# Patient Record
Sex: MALE | Race: WHITE | NOT HISPANIC OR LATINO
[De-identification: names, ages, dates, MRNs, and addresses within clinical notes are randomized per-mention and may not be internally consistent; named-entity substitution may affect disease eponyms.]

---
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
## 2021-03-23 ENCOUNTER — RECORDS - HEALTHEAST (OUTPATIENT)
Dept: ADMINISTRATIVE | Facility: OTHER | Age: 21
End: 2021-03-23

## 2021-04-12 ENCOUNTER — RECORDS - HEALTHEAST (OUTPATIENT)
Dept: ADMINISTRATIVE | Facility: OTHER | Age: 21
End: 2021-04-12

## 2021-05-10 ENCOUNTER — RECORDS - HEALTHEAST (OUTPATIENT)
Dept: ADMINISTRATIVE | Facility: OTHER | Age: 21
End: 2021-05-10

## 2021-05-30 NOTE — TELEPHONE ENCOUNTER
Pt on 8/19/19 CSS schedule for Hep B #2 and MMR #2.    Please review/approve pended order.    Thank you.

## 2021-05-30 NOTE — TELEPHONE ENCOUNTER
Left message to call back for: No message able to be left  Information to relay to patient:  All labs at recent visit were normal.    Daphne Hernandez, Clinic Assistant\

## 2021-05-30 NOTE — TELEPHONE ENCOUNTER
Question following Office Visit  When did you see your provider: 07/17/19    What is your question: Patient will need follow up orders for MMR and Hep B 2nd dosage immunizations . Mother states he was to receive a follow up date to come back in to complete immunizations .  Please advise   Okay to leave a detailed message: Yes

## 2021-06-03 VITALS — WEIGHT: 186 LBS | HEIGHT: 78 IN | BODY MASS INDEX: 21.52 KG/M2

## 2021-06-04 VITALS
DIASTOLIC BLOOD PRESSURE: 78 MMHG | OXYGEN SATURATION: 97 % | TEMPERATURE: 98.2 F | HEART RATE: 64 BPM | RESPIRATION RATE: 18 BRPM | WEIGHT: 213 LBS | SYSTOLIC BLOOD PRESSURE: 112 MMHG | BODY MASS INDEX: 24.93 KG/M2

## 2021-06-07 ENCOUNTER — RECORDS - HEALTHEAST (OUTPATIENT)
Dept: ADMINISTRATIVE | Facility: OTHER | Age: 21
End: 2021-06-07

## 2021-06-09 NOTE — TELEPHONE ENCOUNTER
Medication Question or Clarification  Who is calling: Mother  What medication are you calling about (include dose and sig)?:   ciprofloxacin-dexamethasone (CIPRODEX) otic suspension  7.5 mL  0  7/23/2020 7/30/2020     Sig - Route: Administer 4 drops to the right ear 2 (two) times a day for 7 days. - Right Ear     Sent to pharmacy as: Ciprodex 0.3 %-0.1 % ear drops,suspension (ciprofloxacin-dexamethasone)         Who prescribed the medication?: Dr Pride  What is your question/concern?: States this medication is too expensive and would like an alternative sent to the pharmacy.  Please address today and call mother when sent.  Requested Pharmacy: [facility]  Okay to leave a detailed message?: Yes

## 2021-06-09 NOTE — PROGRESS NOTES
Walk In Care Note                                                        Date of Visit: 7/23/2020     Chief Complaint   Joshua Jones is a(n) 20 y.o. White or  male who presents to [facility] with the following complaint(s):  Ear Pain (Right ear pain)       Assessment and Plan   1. Infective otitis externa, right  - ciprofloxacin-dexamethasone (CIPRODEX) otic suspension; Administer 4 drops to the right ear 2 (two) times a day for 7 days.  Dispense: 7.5 mL; Refill: 0      Treating otitis externa with Ciprodex as listed above. Will change to ofloxacin drops if Ciprodex is too expensive. Continue acetaminophen and / or ibuprofen as needed for discomfort. Recommended no swimming for 1 week.     Counseled patient regarding assessment and plan for evaluation and treatment. Questions were answered. See AVS for the specific written instructions and educational handout(s) regarding otitis externa that were provided at the conclusion of the visit.     Discussed signs / symptoms that warrant urgent / emergent medical attention.     Follow up within 3 days if not improving.      History of Present Illness   Primary symptom: Ear pain  Onset: 5 days ago  Laterality: Right  Progression: Initially improved, now worsening over the past 2 days  Decreased hearing: Muffled  Ear discharge: No  Fevers: No  Chills: No  Upper respiratory symptoms: None  Home therapies utilized: OTC Swimmer's Ear Drops, acetaminophen, and ibuprofen.   History of otitis media: No  History of tympanostomy tubes: No  Recent swimming: Yes  History of cerumen impaction: No     Review of Systems   Review of Systems   All other systems reviewed and are negative.       Physical Exam   Vitals:    07/23/20 1136   BP: 112/78   Patient Site: Right Arm   Patient Position: Sitting   Cuff Size: Adult Regular   Pulse: 64   Resp: 18   Temp: 98.2  F (36.8  C)   TempSrc: Oral   SpO2: 97%   Weight: 213 lb (96.6 kg)     Physical Exam  Vitals signs and  nursing note reviewed.   Constitutional:       General: He is not in acute distress.     Appearance: He is well-developed and normal weight. He is not ill-appearing or toxic-appearing.   HENT:      Head: Normocephalic and atraumatic.      Right Ear: Tympanic membrane and external ear normal. Drainage (moist debris), swelling and tenderness present. No mastoid tenderness.      Left Ear: Tympanic membrane, ear canal and external ear normal.   Neck:      Musculoskeletal: Neck supple. No edema or erythema.   Lymphadenopathy:      Head:      Right side of head: No preauricular or posterior auricular adenopathy.      Cervical: No cervical adenopathy.   Skin:     General: Skin is warm and dry.      Coloration: Skin is not pale.      Findings: No rash.   Neurological:      General: No focal deficit present.      Mental Status: He is alert and oriented to person, place, and time.          Diagnostic Studies   Laboratory:  N/A  Radiology:  N/A  Electrocardiogram:  N/A     Procedure Note   N/A     Pertinent History   The following portions of the patient's history were reviewed and updated as appropriate: allergies, current medications, past family history, past medical history, past social history, past surgical history and problem list.    Patient has Vaccine refused by patient on their problem list.    Patient has a past medical history of Vaccination not carried out because of caregiver refusal (6/9/2016), Varicella, and Warts.    Patient has a past surgical history that includes arm surgery and arm surgery .    Patient's family history includes Cancer in his maternal grandmother; Heart disease in his maternal grandmother; Leukemia in his maternal grandfather; No Medical Problems in his father and mother.    Patient reports that he has never smoked. He has never used smokeless tobacco. He reports that he does not drink alcohol or use drugs.     Portions of this note have been dictated using voice recognition software. Any  grammatical or contextual distortions are unintentional and inherent to the software.    Jesús Pride MD  [facility]

## 2021-06-10 NOTE — TELEPHONE ENCOUNTER
Called regarding request for substitute medication for Ciprodex due to cost.  Sent ofloxacin.  Parent informed as she was the one that called.  No further questions.     Rosy Mitchell, CNP

## 2021-06-16 PROBLEM — Z28.20 VACCINE REFUSED BY PATIENT: Status: ACTIVE | Noted: 2019-07-17

## 2021-06-18 NOTE — PATIENT INSTRUCTIONS - HE
Patient Instructions by Jesús Pride MD at 7/23/2020 11:20 AM     Author: Jesús Pride MD Service: -- Author Type: Physician    Filed: 7/23/2020 12:00 PM Encounter Date: 7/23/2020 Status: Addendum    : Jesús Pride MD (Physician)    Related Notes: Original Note by Jesús Pride MD (Physician) filed at 7/23/2020 11:59 AM       -Use Ciprodex drops as directed.  -Take acetaminophen and / or ibuprofen as needed for discomfort.  -No swimming for the next week.  Patient Education     External Ear Infection (Adult)    External otitis (also called swimmers ear) is an infection in the ear canal. It is often caused by bacteria or fungus. It can occur a few days after water gets trapped in the ear canal (from swimming or bathing). It can also occur after cleaning too deeply in the ear canal with a cotton swab or other object. Sometimes, hair care products get into the ear canal and cause this problem.  Symptoms can include pain, fever, itching, redness, drainage, or swelling of the ear canal. Temporary hearing loss may also occur.  Home care    Do not try to clean the ear canal. This can push pus and bacteria deeper into the canal.    Use prescribed ear drops as directed. These help reduce swelling and fight the infection. If an ear wick was placed in the ear canal, apply drops right onto the end of the wick. The wick will draw the medicine into the ear canal even if it is swollen closed.    A cotton ball may be loosely placed in the outer ear to absorb any drainage.    You may use acetaminophen or ibuprofen to control pain, unless another medicine was prescribed. Note: If you have chronic liver or kidney disease or ever had a stomach ulcer or GI bleeding, talk to your healthcare provider before taking any of these medicines.    Do not allow water to get into your ear when bathing. Also, don't swim until the infection has cleared.  Prevention    Keep your ears dry. This helps lower the risk of  infection. Dry your ears with a towel or hair dryer after getting wet. Also, use ear plugs when swimming.    Do not stick any objects in the ear to remove wax.    If you feel water trapped in your ear, use ear drops right away. You can get these drops over the counter at most drugstores. They work by removing water from the ear canal.  Follow-up care  Follow up with your healthcare provider in 1 week, or as advised.  When to seek medical advice  Call your healthcare provider right away if any of these occur:    Ear pain becomes worse or doesnt improve after 3 days of treatment    Redness or swelling of the outer ear occurs or gets worse    Headache    Painful or stiff neck    Drowsiness or confusion    Fever of 100.4 F (38 C) or higher, or as directed by your healthcare provider    Seizure  Date Last Reviewed: 10/1/2017    4386-1160 [employer]. [address] [place] [id number]. All rights reserved. This information is not intended as a substitute for professional medical care. Always follow your healthcare professional's instructions.

## 2021-06-19 NOTE — LETTER
Letter by Bekah Mejia MD at      Author: Bekah Mejia MD Service: -- Author Type: --    Filed:  Encounter Date: 7/18/2019 Status: (Other)         Joshua Jones  [address] [id number]             July 18, 2019         Dear Mr. Jones,    Below are the results from your recent visit:    Resulted Orders   Comprehensive Metabolic Panel   Result Value Ref Range    Sodium 138 136 - 145 mmol/L    Potassium 4.1 3.5 - 5.0 mmol/L    Chloride 105 98 - 107 mmol/L    CO2 25 22 - 31 mmol/L    Anion Gap, Calculation 8 5 - 18 mmol/L    Glucose 89 70 - 125 mg/dL    BUN 13 8 - 22 mg/dL    Creatinine 0.86 0.70 - 1.30 mg/dL    GFR MDRD Af Amer >60 >60 mL/min/1.73m2    GFR MDRD Non Af Amer >60 >60 mL/min/1.73m2    Bilirubin, Total 0.8 0.0 - 1.0 mg/dL    Calcium 10.2 8.5 - 10.5 mg/dL    Protein, Total 7.4 6.0 - 8.0 g/dL    Albumin 4.4 3.5 - 5.0 g/dL    Alkaline Phosphatase 87 50 - 364 U/L    AST 16 0 - 40 U/L    ALT 12 0 - 45 U/L    Narrative    Fasting Glucose reference range is 70-99 mg/dL per  American Diabetes Association (ADA) guidelines.   Lipid Cascade   Result Value Ref Range    Cholesterol 139 <=199 mg/dL    Triglycerides 64 <=149 mg/dL    HDL Cholesterol 44 >=40 mg/dL    LDL Calculated 82 <=129 mg/dL    Patient Fasting > 8hrs? Yes    HM1 (CBC with Diff)   Result Value Ref Range    WBC 4.2 4.0 - 11.0 thou/uL    RBC 5.36 4.40 - 6.20 mill/uL    Hemoglobin 16.3 14.0 - 18.0 g/dL    Hematocrit 47.0 40.0 - 54.0 %    MCV 88 80 - 100 fL    MCH 30.3 27.0 - 34.0 pg    MCHC 34.6 32.0 - 36.0 g/dL    RDW 12.0 11.0 - 14.5 %    Platelets 209 140 - 440 thou/uL    MPV 6.9 (L) 7.0 - 10.0 fL    Neutrophils % 57 50 - 70 %    Lymphocytes % 32 20 - 40 %    Monocytes % 8 2 - 10 %    Eosinophils % 3 0 - 6 %    Basophils % 0 0 - 2 %    Neutrophils Absolute 2.4 2.0 - 7.7 thou/uL    Lymphocytes Absolute 1.3 0.8 - 4.4 thou/uL    Monocytes Absolute 0.3 0.0 - 0.9 thou/uL    Eosinophils Absolute 0.1 0.0 - 0.4  thou/uL    Basophils Absolute 0.0 0.0 - 0.2 thou/uL       All of this is normal, but I do still want you to seem the hematologist.    Please call with questions or contact us using [employer].    Sincerely,        Electronically signed by Bekah Mejia MD

## 2021-07-03 NOTE — ADDENDUM NOTE
Addendum Note by Nikia Aguiar DO at 7/22/2019  5:38 PM     Author: Nikia Aguiar DO Service: -- Author Type: Physician    Filed: 7/22/2019  5:38 PM Encounter Date: 7/22/2019 Status: Signed    : Nikia Aguiar DO (Physician)    Addended by: NIKIA AGUIAR on: 7/22/2019 05:38 PM        Modules accepted: Orders

## 2021-07-03 NOTE — ADDENDUM NOTE
Addendum Note by Marcelo Rabago Jr., CMA at 7/22/2019  4:45 PM     Author: Marcelo Rabago Jr., CMA Service: -- Author Type: Certified Medical Assistant    Filed: 7/22/2019  4:45 PM Encounter Date: 7/22/2019 Status: Signed    : Marcelo Rabago Jr., CMA (Certified Medical Assistant)    Addended by: MARCELO RABAGO JR. on: 7/22/2019 04:45 PM        Modules accepted: Orders

## 2021-09-02 ENCOUNTER — OFFICE VISIT - RIVER FALLS (OUTPATIENT)
Dept: FAMILY MEDICINE | Facility: CLINIC | Age: 21
End: 2021-09-02

## 2021-09-02 ASSESSMENT — MIFFLIN-ST. JEOR: SCORE: 2019.61

## 2022-02-12 VITALS
HEART RATE: 65 BPM | DIASTOLIC BLOOD PRESSURE: 84 MMHG | WEIGHT: 196.7 LBS | SYSTOLIC BLOOD PRESSURE: 118 MMHG | BODY MASS INDEX: 23.22 KG/M2 | OXYGEN SATURATION: 97 % | HEIGHT: 77 IN

## 2022-02-15 NOTE — NURSING NOTE
Comprehensive Intake Entered On:  9/2/2021 8:29 AM CDT    Performed On:  9/2/2021 8:23 AM CDT by Chiquita Yo CMA               Summary   Chief Complaint :   Sports Px [employer]- Basketball   Weight Measured :   196.7 lb(Converted to: 196 lb 11 oz, 89.222 kg)    Height Measured :   77 in(Converted to: 6 ft 5 in, 195.58 cm)    Body Mass Index :   23.32 kg/m2   Body Surface Area :   2.2 m2   Systolic Blood Pressure :   118 mmHg   Diastolic Blood Pressure :   84 mmHg (HI)    Mean Arterial Pressure :   95 mmHg   Peripheral Pulse Rate :   65 bpm   BP Site :   Right arm   BP Method :   Electronic   HR Method :   Electronic   Oxygen Saturation :   97 %   Chiquita Yo CMA - 9/2/2021 8:23 AM CDT   Health Status   Allergies Verified? :   Yes   Medication History Verified? :   Yes   Medical History Verified? :   Yes   Tobacco Use? :   Never smoker   Chiquita Yo CMA - 9/2/2021 8:23 AM CDT   Consents   Consent for Immunization Exchange :   Consent Granted   Consent for Immunizations to Providers :   Consent Granted   Chiquita Yo CMA - 9/2/2021 8:23 AM CDT   Meds / Allergies   (As Of: 9/2/2021 8:29:20 AM CDT)   Allergies (Active)   No Known Medication Allergies  Estimated Onset Date:   Unspecified ; Created By:   Chiquita Yo CMA; Reaction Status:   Active ; Category:   Drug ; Substance:   No Known Medication Allergies ; Type:   Allergy ; Updated By:   Chiquita Yo CMA; Reviewed Date:   9/2/2021 8:27 AM CDT        Medication List   (As Of: 9/2/2021 8:29:20 AM CDT)   No Known Home Medications     Chiquita Yo CMA - 9/2/2021 8:27:27 AM           Vision Testing POC   Corrective Lenses :   None   Eye, Left Visual Acuity :   20/25   Eye, Right Visual Acuity :   20/25   Chiquita Yo CMA - 9/2/2021 8:23 AM CDT   Social History   Social History   (As Of: 9/2/2021 8:29:20 AM CDT)   Tobacco:        Never (less than 100 in lifetime)   (Last Updated: 9/2/2021 8:27:13 AM CDT by Chiquita Yo CMA)          Electronic Cigarette/Vaping:         Electronic Cigarette Use: Never.   (Last Updated: 9/2/2021 8:27:16 AM CDT by Chiquita Yo CMA)

## 2022-02-15 NOTE — PROGRESS NOTES
Patient:   BECK VAN            MRN: [id number]            FIN: [id number]               Age:   21 years     Sex:  Male     :  2000   Associated Diagnoses:   Encounter for annual physical exam; Routine sports examination   Author:   Dylan Trinh PA-C      Report Summary   Diagnosis  Encounter for annual physical exam ([id number] Z00.00).  Patient InstructionsSummary   Visit Information      Date of Service: 2021 08:09 am  Performing Location: [facility]  Encounter#: [id number]      Primary Care Provider (PCP):  NONE ,       Referring Provider:  Dylan Trinh PA-C    NPI# [id number]   Visit type:  Annual exam.    Accompanied by:  No one.    Source of history:  Self.    Referral source:  Self.    History limitation:  None.       Chief Complaint   2021 8:23 AM CDT     Sports Px [facility]- Basketball        Well Adult History   Well Adult History             The patient presents for well adult exam.  The patient's general health status is described as good.  Exercise: routine.  Associated symptoms consist of none.  Additional pertinent history: no caffeine use, tobacco use none and alcohol use socially.  [facility] athletic exam. Basketball No history of ortho injuries. See health history in chart. Has not had Covid infection..        Review of Systems   Constitutional:  Negative.    Eye:  Negative.    Ear/Nose/Mouth/Throat:  Negative.    Respiratory:  Negative.    Cardiovascular:  Negative.    Gastrointestinal:  Negative.    Genitourinary:  Negative.    Hematology/Lymphatics:  Negative.    Endocrine:  Negative.    Immunologic:  Negative.    Musculoskeletal:  Negative.    Integumentary:  Negative.    Neurologic:  Negative.    Psychiatric:  Negative.    All other systems reviewed and negative      Health Status   Allergies:    Allergic Reactions (Selected)  No Known Medication Allergies   Medications:  (Selected)         Histories   Past Medical History:    No active or resolved past  medical history items have been selected or recorded.   Family History:    No family history items have been selected or recorded.   Procedure history:    No active procedure history items have been selected or recorded.   Social History:        Electronic Cigarette/Vaping Assessment            Electronic Cigarette Use: Never.      Tobacco Assessment            Never (less than 100 in lifetime)        Physical Examination   Vital Signs   9/2/2021 8:23 AM CDT Peripheral Pulse Rate 65 bpm    HR Method Electronic    Systolic Blood Pressure 118 mmHg    Diastolic Blood Pressure 84 mmHg  HI    Mean Arterial Pressure 95 mmHg    BP Site Right arm    BP Method Electronic    Oxygen Saturation 97 %      Measurements from flowsheet : Measurements   9/2/2021 8:23 AM CDT Height Measured - Standard 77 in    Weight Measured - Standard 196.7 lb    BSA 2.2 m2    Body Mass Index 23.32 kg/m2      General:  Alert and oriented, No acute distress.    Eye:  Pupils are equal, round and reactive to light, Extraocular movements are intact, Normal conjunctiva.    HENT:  Normocephalic, Tympanic membranes are clear, Oral mucosa is moist, No pharyngeal erythema.    Neck:  Supple, Non-tender, No lymphadenopathy.    Respiratory:  Lungs are clear to auscultation, Respirations are non-labored, Breath sounds are equal.    Cardiovascular:  Normal rate, Regular rhythm, Good pulses equal in all extremities, Normal peripheral perfusion, No edema.    Gastrointestinal:  Soft, Non-tender, Non-distended, Normal bowel sounds, No organomegaly.    Genitourinary:  No costovertebral angle tenderness.    Musculoskeletal:  Normal range of motion, Normal strength, No tenderness, No swelling, No deformity, Normal gait.    Integumentary:  No rash.    Neurologic:  No focal deficits.    Psychiatric:  Cooperative, Appropriate mood & affect, Normal judgment, Non-suicidal.       Health Maintenance      Recommendations     Pending (in the next year)        Due             Influenza Vaccine due  09/01/21  and every 1  year(s)           Alcohol Misuse Screen due  09/02/21  and every 1  year(s)           Depression Screen due  09/02/21  and every 1  year(s)           HIV Screen (if sexually active) due  09/02/21  and every 1  year(s)           STD Counseling (if sexually active) due  09/02/21  and every 1  year(s)           Syphilis Screen (if sexually active) due  09/02/21  and every 1  year(s)           Tetanus Vaccine due  09/02/21  and every 10  year(s)     Satisfied (in the past 1 year)        Satisfied            Body Mass Index Check on  09/02/21.           High Blood Pressure Screen on  09/02/21.           Tobacco Use Screen on  09/02/21.          Impression and Plan   Diagnosis     Encounter for annual physical exam ([id number] Z00.00).     Routine sports examination ([id number] Z02.5).     Patient Instructions:       Counseled: Patient, Verbalized understanding.    Summary:  Cleared without restriction. See form in chart..

## 2022-02-15 NOTE — NURSING NOTE
Depression Screening Entered On:  9/2/2021 9:46 AM CDT    Performed On:  9/2/2021 9:46 AM CDT by Chiquita Yo CMA               Depression Screening   Little Interest - Pleasure in Activities :   Not at all   Feeling Down, Depressed, Hopeless :   Not at all   Initial Depression Screen Score :   0 Score   Poor Appetite or Overeating :   Not at all   Trouble Falling or Staying Asleep :   Several days   Feeling Tired or Little Energy :   Not at all   Feeling Bad About Yourself :   Not at all   Trouble Concentrating :   Not at all   Moving or Speaking Slowly :   Not at all   Thoughts Better Off Dead or Hurting Self :   Not at all   Difficulty at Work, Home, Getting Along :   Not difficult at all   Detailed Depression Screen Score :   1    Total Depression Screen Score :   1    Chiquita Yo CMA - 9/2/2021 9:46 AM CDT

## 2022-02-15 NOTE — NURSING NOTE
CAGE Assessment Entered On:  9/2/2021 9:46 AM CDT    Performed On:  9/2/2021 9:46 AM CDT by Chiquita Yo CMA               Assessment   Have you ever felt you should cut down on your drinking :   No   Have people annoyed you by criticizing your drinking :   No   Have you ever felt bad or guilty about your drinking :   No   Have you ever taken a drink first thing in the morning to steady your nerves or get rid of a hangover (Eye-opener) :   No   CAGE Score :   0    Chiquita Yo CMA - 9/2/2021 9:46 AM CDT

## 2023-06-18 ENCOUNTER — TRANSFERRED RECORDS (OUTPATIENT)
Dept: HEALTH INFORMATION MANAGEMENT | Facility: CLINIC | Age: 23
End: 2023-06-18